# Patient Record
Sex: MALE | Race: WHITE | Employment: FULL TIME | URBAN - NONMETROPOLITAN AREA
[De-identification: names, ages, dates, MRNs, and addresses within clinical notes are randomized per-mention and may not be internally consistent; named-entity substitution may affect disease eponyms.]

---

## 2021-09-03 ENCOUNTER — NURSE ONLY (OUTPATIENT)
Dept: LAB | Age: 51
End: 2021-09-03

## 2023-09-25 ENCOUNTER — OFFICE VISIT (OUTPATIENT)
Dept: FAMILY MEDICINE CLINIC | Age: 53
End: 2023-09-25

## 2023-09-25 VITALS
SYSTOLIC BLOOD PRESSURE: 124 MMHG | RESPIRATION RATE: 16 BRPM | HEART RATE: 60 BPM | OXYGEN SATURATION: 96 % | DIASTOLIC BLOOD PRESSURE: 82 MMHG | BODY MASS INDEX: 28.2 KG/M2 | TEMPERATURE: 97.6 F | WEIGHT: 208.2 LBS | HEIGHT: 72 IN

## 2023-09-25 DIAGNOSIS — J02.9 SORE THROAT: ICD-10-CM

## 2023-09-25 DIAGNOSIS — R09.81 SINUS CONGESTION: Primary | ICD-10-CM

## 2023-09-25 LAB
Lab: NORMAL
QC PASS/FAIL: NORMAL
SARS-COV-2 RDRP RESP QL NAA+PROBE: NEGATIVE
STREPTOCOCCUS A RNA: NEGATIVE

## 2023-09-25 RX ORDER — LOSARTAN POTASSIUM 50 MG/1
50 TABLET ORAL DAILY
COMMUNITY
Start: 2023-08-21

## 2023-09-25 RX ORDER — ALLOPURINOL 100 MG/1
TABLET ORAL
COMMUNITY

## 2023-09-25 RX ORDER — DOXYCYCLINE HYCLATE 100 MG
100 TABLET ORAL 2 TIMES DAILY
Qty: 14 TABLET | Refills: 0 | Status: SHIPPED | OUTPATIENT
Start: 2023-09-25 | End: 2023-10-02

## 2023-09-25 ASSESSMENT — ENCOUNTER SYMPTOMS
ABDOMINAL PAIN: 0
RHINORRHEA: 1
VOMITING: 0
DIARRHEA: 0
WHEEZING: 0
SORE THROAT: 1
COUGH: 0
NAUSEA: 0
SHORTNESS OF BREATH: 0

## 2023-09-25 ASSESSMENT — PATIENT HEALTH QUESTIONNAIRE - PHQ9
1. LITTLE INTEREST OR PLEASURE IN DOING THINGS: 0
SUM OF ALL RESPONSES TO PHQ QUESTIONS 1-9: 0
2. FEELING DOWN, DEPRESSED OR HOPELESS: 0
SUM OF ALL RESPONSES TO PHQ9 QUESTIONS 1 & 2: 0
SUM OF ALL RESPONSES TO PHQ QUESTIONS 1-9: 0

## 2023-09-25 NOTE — PROGRESS NOTES
[address] [facility]  [address] [place] [id number]  Dept: [phone]  Dept Fax: [phone]  Loc: [phone]    Kirill Carson is a 48 y.o. male who presents today for his medical conditions/complaints as noted below. Chief Complaint   Patient presents with    Other     Pt complaining of scratchiness within sinuses- leaves [place] tomorrow, sx started over the weekend        HPI:     Patient presents to the office today for concerns of sinus scratchiness. States that over the last few days, he has noticed some scratchiness in his sinuses and postnasal drip. Denies any associated fever, chills, ear pain, cough, chest congestion, nausea, vomiting, diarrhea, or abdominal pain. Has had some sore throat from postnasal drip. Eating and drinking well without issues. No one else is sick at home currently. Patient mainly wanted to be seen today because he leaves to go to [place] tomorrow through the beginning of next week. Past Medical History:   Diagnosis Date    History of kidney stones       Past Surgical History:   Procedure Laterality Date    HERNIA REPAIR         Family History   Problem Relation Age of Onset    Heart Disease Mother     Diabetes Mother     Cancer Mother         colon       Social History     Tobacco Use    Smoking status: Former     Types: Cigarettes     Quit date: 2003     Years since quittin.9    Smokeless tobacco: Never   Substance Use Topics    Alcohol use: Not on file      Current Outpatient Medications   Medication Sig Dispense Refill    doxycycline hyclate (VIBRA-TABS) 100 MG tablet Take 1 tablet by mouth 2 times daily for 7 days 14 tablet 0    allopurinol (ZYLOPRIM) 100 MG tablet Take by mouth      losartan (COZAAR) 50 MG tablet Take 1 tablet by mouth daily       No current facility-administered medications for this visit.      Allergies   Allergen Reactions    Erythromycin     Pcn

## 2023-10-12 ENCOUNTER — OFFICE VISIT (OUTPATIENT)
Dept: FAMILY MEDICINE CLINIC | Age: 53
End: 2023-10-12
Payer: COMMERCIAL

## 2023-10-12 VITALS
OXYGEN SATURATION: 97 % | HEIGHT: 72 IN | RESPIRATION RATE: 16 BRPM | DIASTOLIC BLOOD PRESSURE: 80 MMHG | BODY MASS INDEX: 27.77 KG/M2 | WEIGHT: 205 LBS | SYSTOLIC BLOOD PRESSURE: 138 MMHG | HEART RATE: 102 BPM

## 2023-10-12 DIAGNOSIS — Z11.4 ENCOUNTER FOR SCREENING FOR HIV: ICD-10-CM

## 2023-10-12 DIAGNOSIS — Z11.59 NEED FOR HEPATITIS C SCREENING TEST: ICD-10-CM

## 2023-10-12 DIAGNOSIS — L20.84 INTRINSIC ATOPIC DERMATITIS: Primary | ICD-10-CM

## 2023-10-12 DIAGNOSIS — I10 PRIMARY HYPERTENSION: ICD-10-CM

## 2023-10-12 DIAGNOSIS — Z13.1 SCREENING FOR DIABETES MELLITUS: ICD-10-CM

## 2023-10-12 DIAGNOSIS — F41.9 ANXIETY: ICD-10-CM

## 2023-10-12 DIAGNOSIS — R23.3 PETECHIAE: ICD-10-CM

## 2023-10-12 PROBLEM — M10.9 GOUT: Status: ACTIVE | Noted: 2023-10-12

## 2023-10-12 PROBLEM — L20.9 ATOPIC DERMATITIS: Status: ACTIVE | Noted: 2021-05-04

## 2023-10-12 LAB
ALBUMIN SERPL-MCNC: 4.6 G/DL
ALP BLD-CCNC: 66 U/L
ALT SERPL-CCNC: 27 U/L
ANION GAP SERPL CALCULATED.3IONS-SCNC: 11 MMOL/L
AST SERPL-CCNC: 24 U/L
AVERAGE GLUCOSE: 140
BASOPHILS ABSOLUTE: 0 /ΜL
BASOPHILS RELATIVE PERCENT: 0.5 %
BILIRUB SERPL-MCNC: 0.9 MG/DL (ref 0.1–1.4)
BUN BLDV-MCNC: 16 MG/DL
CALCIUM SERPL-MCNC: 9.3 MG/DL
CHLORIDE BLD-SCNC: 105 MMOL/L
CHOLESTEROL, TOTAL: 160 MG/DL
CHOLESTEROL/HDL RATIO: ABNORMAL
CO2: 27 MMOL/L
CREAT SERPL-MCNC: 0.98 MG/DL
EGFR: >60
EOSINOPHILS ABSOLUTE: 0 /ΜL
EOSINOPHILS RELATIVE PERCENT: 0.8 %
GLUCOSE BLD-MCNC: 117 MG/DL
HBA1C MFR BLD: 6.5 %
HCT VFR BLD CALC: 45.2 % (ref 41–53)
HDLC SERPL-MCNC: 31 MG/DL (ref 35–70)
HEMOGLOBIN: 15.9 G/DL (ref 13.5–17.5)
INR BLD: 1
LDL CHOLESTEROL CALCULATED: 67 MG/DL (ref 0–160)
LYMPHOCYTES ABSOLUTE: 2 /ΜL
LYMPHOCYTES RELATIVE PERCENT: 36.2 %
MCH RBC QN AUTO: 29.7 PG
MCHC RBC AUTO-ENTMCNC: 35 G/DL
MCV RBC AUTO: 84.8 FL
MONOCYTES ABSOLUTE: 0.4 /ΜL
MONOCYTES RELATIVE PERCENT: 6.9 %
NEUTROPHILS ABSOLUTE: 3.1 /ΜL
NEUTROPHILS RELATIVE PERCENT: 55.6 %
NONHDLC SERPL-MCNC: ABNORMAL MG/DL
PDW BLD-RTO: 13 %
PLATELET # BLD: 228 K/ΜL
PMV BLD AUTO: 9.3 FL
POTASSIUM SERPL-SCNC: 4.3 MMOL/L
PROTIME: 10.7 SECONDS
RBC # BLD: 5.33 10^6/ΜL
SODIUM BLD-SCNC: 139 MMOL/L
TOTAL PROTEIN: 7.4
TRIGL SERPL-MCNC: 310 MG/DL
TSH SERPL DL<=0.05 MIU/L-ACNC: 1.35 UIU/ML
VLDLC SERPL CALC-MCNC: 62 MG/DL
WBC # BLD: 5.7 10^3/ML

## 2023-10-12 PROCEDURE — 3074F SYST BP LT 130 MM HG: CPT | Performed by: FAMILY MEDICINE

## 2023-10-12 PROCEDURE — 99214 OFFICE O/P EST MOD 30 MIN: CPT | Performed by: FAMILY MEDICINE

## 2023-10-12 PROCEDURE — 3078F DIAST BP <80 MM HG: CPT | Performed by: FAMILY MEDICINE

## 2023-10-12 NOTE — PROGRESS NOTES
Attending attestation:  I personally performed and participated key or critical portions of the evaluation and management including personally performing the exam and medical decision making. I verify the accuracy of the documentation by the resident with the following addition or changes: Here to establish. Has a rash on ankles that is new in the last couple weeks. Feeling better from recent illness with COVID-19. Rash pre-dated this. A bit tired from work trip to [facility] and [facility]. Was prescribed doxycycline prior to trip for sinus symptoms. On losartan for hypertension and allopurinol for gout. Rash is asymptomatic. Started before COVID-19. Occasional ibuprofen. A couple in the last week. Not dizzy. Mild orthostasis with standing. Had colonoscopy in last 1-2 years. On exam has petechiae on bilateral ankles. Follicular based red spots are reportedly always present lifetime. Will plan to check labs and have patient follow-up based on results.                 Electronically signed by Marquita Asif MD on 10/12/2023 at 10:53 AM

## 2023-10-12 NOTE — PROGRESS NOTES
[address] MEDICINE   [facility] [address]  Dept: [phone]  Loc: [phone] (:  1970) is a 48 y.o. male,Established patient, here for evaluation of the following chief complaint(s):  Rash (Bilateral ankle rash - declines itching - x couple weeks )      ASSESSMENT/PLAN:  1. Petechiae   APTT, platelet function analysis, INR  CBC w/auto diff, CMP, Hep C Ab, HIV screen, lipid panel  -     Von Willebrand Panel; Future  2. Intrinsic atopic dermatitis  3. Primary hypertension   Continue losartan 50mg PO 1x day  4. Anxiety  5. Encounter for screening for HIV   HIV screen  6. Need for hepatitis C screening test   Hep C Ab  7. Screening for diabetes mellitus   HbA1C, CMP      Return in about 2 weeks (around 10/26/2023) for Annual wellness visit, re-check petechiae. SUBJECTIVE/OBJECTIVE:    Patient reports he is doing ok, tired from work trip / conference to [place]. Got covid on the way back ~1 week ago. Not currently symptomatic. Was here in office day before trip with likely viral URI, was prescribed doxycycline if needed due to international travel. Patient is here for rash (petechia) around ankles, started at least a few weeks ago. No previous rash with same appearance. States has not gotten worse or better since then. No travel preceeding. Walks outdoors in [place] walking path in [place]. Petechia around ankles. Long time follicular red coloration present on lower extremities. Denies coagulopathy personal medical or family history  Patient reports feeling mild orthopnea, denies feeling dizzy. Patient denies current chest pain, SOB, n/v/d, other urinary or bowel complaints, feeling dizzy or lightheaded, or other symptoms at this time. Patient reports that he has had colonoscopy about 2 years ago. Patient declines vaccines at this time.     Review of Systems   Constitutional:  Negative for

## 2023-10-13 ASSESSMENT — ENCOUNTER SYMPTOMS
SHORTNESS OF BREATH: 0
DIARRHEA: 0
SORE THROAT: 0
NAUSEA: 0
CONSTIPATION: 0
ABDOMINAL PAIN: 0
VOMITING: 0
RHINORRHEA: 0

## 2023-10-16 ENCOUNTER — TELEPHONE (OUTPATIENT)
Dept: FAMILY MEDICINE CLINIC | Age: 53
End: 2023-10-16

## 2023-10-16 DIAGNOSIS — R89.9 ABNORMAL LABORATORY TEST: ICD-10-CM

## 2023-10-16 DIAGNOSIS — R23.3 PETECHIAE: Primary | ICD-10-CM

## 2023-10-16 NOTE — TELEPHONE ENCOUNTER
Amadou Godinez MD  [facility] Clinical Staff  Cc: Dante Bradley, DO  Labs back. New diabetes but no cause of petechiae on bilateral lower extremities identified. Possible Schamberg's disease but would appreciate Dr. Cadet Del input also on any additional work-up. Platelet count normal.  One test still pending but negative work-up otherwise. Especially if lesions are non-resolving might consider biopsy or additional evaluation for vasculitis. Confirm all cancer screenings are up to date. Monitor for any additional symptoms.

## 2023-10-16 NOTE — TELEPHONE ENCOUNTER
Patient called back. He stated he does have questions.  Informed to keep phone handy and  to call back

## 2023-10-16 NOTE — TELEPHONE ENCOUNTER
Message  Received: 3 days ago  Hoblit, Lindell Ahumada, MD  [facility] Clinical Staff  Please let patient know that blood sugars were elevated. Until appointment he can aim for a low carb, higher protein diet. Dr. Hermelindo Mix will discuss diagnosis further at upcoming appointment.

## 2023-10-16 NOTE — TELEPHONE ENCOUNTER
Chatted with patient. He reports petechiae are improved. Discussed I am uncertain of the significance of test results. Possible false positive. I have placed a referral to hematology/oncology to discuss. On further review it is also possible petechiae were an early COVID-19 symptom before he realized he was sick otherwise. Appreciate opinoin of specialist. Reviewed indications for prompt presentation. Reviewed nothing on labs makes me suspect oncologic diagnosis. Appreciated call.

## 2023-10-16 NOTE — TELEPHONE ENCOUNTER
----- Message from Kian De La Cruz MD sent at 10/16/2023 11:41 AM EDT -----  Abnormal activity in Von Willebrand Panel. Would appreciate hematology oncology input. I have placed a referral.  I am happy to chat with the patient about results. Please call and see if he has questions and put me on the phone with him if he does. Thanks.

## 2023-10-25 NOTE — PROGRESS NOTES
[facility] [address] [place] [address] Dionisio Flood [address]  [address] [facility] [address] [address]  Dept: [phone]  Dept Fax: [phone]: [phone]       Visit Date:10/27/2023     Elo Maher is a 48 y.o. male who presents today for:   Chief Complaint   Patient presents with    New Patient     Petechiae,Abnormal Lab Test        HPI:   Elo Maher is a 48 y.o. male referred to our office by Dr. Easton Trevizo for evaluation of petechiae, abnormal laboratory test.  PMH includes urolithiasis, anxiety, gout, HTN. Petechial rash noted on bilateral ankles, saw PCP. Recent COVID-19 at end of September/beginning of October. Platelets normal.  VWD panel abnormal.  He denies history of blood clots, CVAs, or MIs. Family history includes CVA, DM, cancer in mother. He does not currently smoke but has remote history of smoking until age 24. He denies alcohol or street drug use. VSS. He reports fatigue and dizziness at times after having COVID. He denies headaches, cough, SOB, CP, heart palpitations, LE redness/edema/warmth/pain, s/s bleeding. Petechial rash much improved per pt report, almost completely resolved. PMH, SH, and FH:  I reviewed the patient's medication and allergy lists as noted on the electronic medical record. The PMH, SH, and FH were also reviewed as noted on the EMR.         Past Medical History:   Diagnosis Date    Anxiety     Atopic dermatitis 2021    Gout     History of kidney stones     Hypertension     Seborrheic dermatitis 2021      Past Surgical History:   Procedure Laterality Date    HERNIA REPAIR        Family History   Problem Relation Age of Onset    Heart Disease Mother     Diabetes Mother     Cancer Mother         colon      Social History     Tobacco Use    Smoking status: Former     Packs/day: 1.00     Years: 5.00     Additional pack years: 0.00     Total pack years: 5.00     Types: Cigarettes     Quit date: 2003     Years since quittin.9

## 2023-10-27 ENCOUNTER — HOSPITAL ENCOUNTER (OUTPATIENT)
Dept: INFUSION THERAPY | Age: 53
Discharge: HOME | End: 2023-10-27
Payer: COMMERCIAL

## 2023-10-27 ENCOUNTER — OFFICE VISIT (OUTPATIENT)
Dept: ONCOLOGY | Age: 53
End: 2023-10-27
Payer: COMMERCIAL

## 2023-10-27 VITALS
RESPIRATION RATE: 16 BRPM | TEMPERATURE: 98.1 F | DIASTOLIC BLOOD PRESSURE: 80 MMHG | SYSTOLIC BLOOD PRESSURE: 130 MMHG | HEART RATE: 72 BPM

## 2023-10-27 VITALS
WEIGHT: 204 LBS | OXYGEN SATURATION: 98 % | DIASTOLIC BLOOD PRESSURE: 80 MMHG | RESPIRATION RATE: 16 BRPM | TEMPERATURE: 98.1 F | BODY MASS INDEX: 27.63 KG/M2 | SYSTOLIC BLOOD PRESSURE: 130 MMHG | HEART RATE: 72 BPM | HEIGHT: 72 IN

## 2023-10-27 DIAGNOSIS — R23.3 PETECHIAE: Primary | ICD-10-CM

## 2023-10-27 DIAGNOSIS — R23.3 PETECHIAE: ICD-10-CM

## 2023-10-27 PROCEDURE — 99211 OFF/OP EST MAY X REQ PHY/QHP: CPT

## 2023-10-27 PROCEDURE — 85240 CLOT FACTOR VIII AHG 1 STAGE: CPT

## 2023-10-27 PROCEDURE — 3079F DIAST BP 80-89 MM HG: CPT | Performed by: NURSE PRACTITIONER

## 2023-10-27 PROCEDURE — 85247 CLOT FACTOR VIII MULTIMETRIC: CPT

## 2023-10-27 PROCEDURE — 36415 COLL VENOUS BLD VENIPUNCTURE: CPT

## 2023-10-27 PROCEDURE — 85245 CLOT FACTOR VIII VW RISTOCTN: CPT

## 2023-10-27 PROCEDURE — 3075F SYST BP GE 130 - 139MM HG: CPT | Performed by: NURSE PRACTITIONER

## 2023-10-27 PROCEDURE — 85246 CLOT FACTOR VIII VW ANTIGEN: CPT

## 2023-10-27 PROCEDURE — 99203 OFFICE O/P NEW LOW 30 MIN: CPT | Performed by: NURSE PRACTITIONER

## 2023-11-01 NOTE — PROGRESS NOTES
[address] [address]  Dept: [phone]  Dept Fax: [phone]  Loc: [phone]    Germaine Christine is a 48 y.o. male who presents today for his medical conditions/complaints as noted below. Chief Complaint   Patient presents with    New Patient     Previous pt of Les Graves- pt denies any concerns or issues        HPI:     Patient is here in the office today to establish care. 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 Ruben Farr -- will get records  Shingles vaccine: due  Influenza vaccine: thinks he may have had this fall  Pneumonia vaccine: will think about this    Patient was seen in our office by another provider on 10/12/2023 for petechial rash on ankles. Labs at that time showed normal CBC, hepatitis C screening, HIV screening, PT/INR, PTT, CMP, TSH. Cholesterol panel showed low HDL, elevated triglycerides. Hemoglobin A1c 6.5% (new onset type 2 diabetes). Von Willebrand factor activity slightly elevated. Patient was referred to hematology for evaluation. Patient saw hematology Otemma Wilkerson) on 10/27/2023 --it was noted that von Willebrand activity can be abnormal after COVID-19 infection, which patient did have recently. Von Willebrand multimeric panel was ordered for further evaluation.   It was noted that patient would not need any additional lab work-up if this panel is normal. Rash is significantly

## 2023-11-02 ENCOUNTER — OFFICE VISIT (OUTPATIENT)
Dept: FAMILY MEDICINE CLINIC | Age: 53
End: 2023-11-02
Payer: COMMERCIAL

## 2023-11-02 VITALS
RESPIRATION RATE: 16 BRPM | HEIGHT: 72 IN | OXYGEN SATURATION: 97 % | BODY MASS INDEX: 28.17 KG/M2 | SYSTOLIC BLOOD PRESSURE: 120 MMHG | DIASTOLIC BLOOD PRESSURE: 80 MMHG | WEIGHT: 208 LBS | HEART RATE: 90 BPM

## 2023-11-02 DIAGNOSIS — E78.5 DYSLIPIDEMIA: ICD-10-CM

## 2023-11-02 DIAGNOSIS — E11.9 TYPE 2 DIABETES MELLITUS WITHOUT COMPLICATION, WITHOUT LONG-TERM CURRENT USE OF INSULIN (HCC): Primary | ICD-10-CM

## 2023-11-02 DIAGNOSIS — R23.3 PETECHIAE: ICD-10-CM

## 2023-11-02 DIAGNOSIS — Z87.39 HISTORY OF GOUT: ICD-10-CM

## 2023-11-02 DIAGNOSIS — I10 PRIMARY HYPERTENSION: ICD-10-CM

## 2023-11-02 PROCEDURE — 3079F DIAST BP 80-89 MM HG: CPT | Performed by: STUDENT IN AN ORGANIZED HEALTH CARE EDUCATION/TRAINING PROGRAM

## 2023-11-02 PROCEDURE — 99214 OFFICE O/P EST MOD 30 MIN: CPT | Performed by: STUDENT IN AN ORGANIZED HEALTH CARE EDUCATION/TRAINING PROGRAM

## 2023-11-02 PROCEDURE — 3044F HG A1C LEVEL LT 7.0%: CPT | Performed by: STUDENT IN AN ORGANIZED HEALTH CARE EDUCATION/TRAINING PROGRAM

## 2023-11-02 PROCEDURE — 3074F SYST BP LT 130 MM HG: CPT | Performed by: STUDENT IN AN ORGANIZED HEALTH CARE EDUCATION/TRAINING PROGRAM

## 2023-11-02 RX ORDER — KETOCONAZOLE 20 MG/G
CREAM TOPICAL
COMMUNITY
Start: 2023-10-26

## 2023-11-02 RX ORDER — CICLOPIROX 1 G/100ML
SHAMPOO TOPICAL
COMMUNITY
Start: 2023-10-31

## 2023-11-02 ASSESSMENT — ENCOUNTER SYMPTOMS
VOMITING: 0
NAUSEA: 0
SHORTNESS OF BREATH: 0
ABDOMINAL PAIN: 0
COUGH: 0

## 2023-11-02 NOTE — PROGRESS NOTES
Que Valles is a 48 y.o. male who presents today for:  Chief Complaint   Patient presents with    New Patient     Previous pt of Odilia Kulkarni- pt denies any concerns or issues      HPI:   Que Valles is 48 y.o. who presents today to establish care in office and for annual physical.      Previously followed with Dr. Odilia Kulkarni, who he reports moved and so he would like to establish care in this office. Reports hx of kidney stones -- none in the recent past.  Has passed through proper hydration in the past.  No surgical intervention needed in the past.    At last visit in office with Dr. Horton Said, pt was noted to have a petechial rash in the ankles. Labs were normal overall. Showed elevated vWF activity. Also noted to have recent COVID-19 infection earlier this month. Further follow up with Hematology Edita Stack) noted elevated vWF to be related to COVID-19 infection. Repeat vWF labs ordered and patient is scheduled to follow up further with hematology. Today, he reports that the rash in the ankles has reduced since last noted in previous visit note. Other labs done recently (10/12/23) showed elevated HbA1c of 6.5%. Pt reports no prior history of DM or elevated HbA1c noted previously. PMHx: HTN, Gout, Nephrolithiasis, New-onset DM    Medications:  Losartan (for HTN), Allopurinol (for Gout). Reports compliance with medications. Family Hx:  DM and Colon CA in the mother. Father: Nil    Surgical Hx:  None    Social Hx:  Non-smoker, (hx of smoking in the 20s, not chronic). Rare EtOH use. Works at [employer] as a [employer] and . Lives with Wife and son at home, has a daughter who currently is in college. Diet: vegan. Exercise: used to bike more often previously. Lately has been walking more. Preventive Care:   -- Colon CA screening:  Reports having Colonoscopy done last year -- results negative.   Recommended follow up in 5 years, due to (+) family hx

## 2023-11-03 LAB — VON WILLEBRAND MULTIMERS: NORMAL

## 2023-11-07 DIAGNOSIS — R23.3 PETECHIAE: Primary | ICD-10-CM

## 2024-02-16 ENCOUNTER — NURSE ONLY (OUTPATIENT)
Dept: FAMILY MEDICINE CLINIC | Age: 54
End: 2024-02-16
Payer: COMMERCIAL

## 2024-02-16 DIAGNOSIS — E11.9 TYPE 2 DIABETES MELLITUS WITHOUT COMPLICATION, UNSPECIFIED WHETHER LONG TERM INSULIN USE (HCC): Primary | ICD-10-CM

## 2024-02-16 DIAGNOSIS — E11.9 TYPE 2 DIABETES MELLITUS WITHOUT COMPLICATION, WITHOUT LONG-TERM CURRENT USE OF INSULIN (HCC): ICD-10-CM

## 2024-02-16 DIAGNOSIS — E78.5 DYSLIPIDEMIA: ICD-10-CM

## 2024-02-16 LAB
CHOLEST SERPL-MCNC: 165 MG/DL (ref 100–199)
CREAT UR-MCNC: 210 MG/DL
DEPRECATED MEAN GLUCOSE BLD GHB EST-ACNC: 105 MG/DL (ref 70–126)
HBA1C MFR BLD HPLC: 5.5 % (ref 4.4–6.4)
HDLC SERPL-MCNC: 36 MG/DL
LDLC SERPL CALC-MCNC: 99 MG/DL
MICROALBUMIN UR-MCNC: < 1.2 MG/DL
MICROALBUMIN/CREAT RATIO PNL UR: 6 MG/G (ref 0–30)
TRIGL SERPL-MCNC: 152 MG/DL (ref 0–199)

## 2024-02-16 PROCEDURE — 36415 COLL VENOUS BLD VENIPUNCTURE: CPT | Performed by: STUDENT IN AN ORGANIZED HEALTH CARE EDUCATION/TRAINING PROGRAM

## 2024-02-19 ENCOUNTER — TELEPHONE (OUTPATIENT)
Dept: FAMILY MEDICINE CLINIC | Age: 54
End: 2024-02-19

## 2024-02-19 NOTE — PROGRESS NOTES
[facility] PROFESSIONAL SERVS  [facility]  [address] [id number]  Dept: [phone]  Dept Fax: [phone]  Loc: [phone]    Francis Saavedra is a 54 y.o. male who presents today for his medical conditions/complaints as noted below.     Chief Complaint   Patient presents with    Diabetes     Pt denies any concerns or issues        HPI:     Patient presents the office today for a follow-up on labs.    Labs showed significantly improved hemoglobin A1c at 5.5% (down from 6.5%), normal urine microalbumin, and stable cholesterol panel.  Patient is not currently taking any cholesterol or diabetes medication -- would prefer to hold off on statin at this time.    The 10-year ASCVD risk score (Sim HICKS, et al., 2019) is: 8.9%    Values used to calculate the score:      Age: 54 years      Sex: Male      Is Non- : No      Diabetic: Yes      Tobacco smoker: No      Systolic Blood Pressure: 120 mmHg      Is BP treated: Yes      HDL Cholesterol: 36 mg/dL      Total Cholesterol: 165 mg/dL    Hypertension:  Take losartan 50 mg daily without issues.  /80 in the office today.    Patient [facility] have [facility] Taveras.        Past Medical History:   Diagnosis Date    Anxiety     Atopic dermatitis 2021    Gout     History of kidney stones     Hypertension     Seborrheic dermatitis 2021      Past Surgical History:   Procedure Laterality Date    HERNIA REPAIR         Family History   Problem Relation Age of Onset    Heart Disease Mother     Diabetes Mother     Cancer Mother         colon       Social History     Tobacco Use    Smoking status: Former     Current packs/day: 0.00     Average packs/day: 1 pack/day for 5.0 years (5.0 ttl pk-yrs)     Types: Cigarettes     Start date: 1998     Quit date: 2003     Years since quittin.3     Passive exposure: Never    Smokeless tobacco:

## 2024-02-19 NOTE — TELEPHONE ENCOUNTER
----- Message from Mercedes Duggan DO sent at 2/16/2024  2:47 PM EST -----  Please let patient know that his labs showed normal urine test for diabetes, significantly improved hemoglobin A1c (it went from 6.5% to 5.5%), and cholesterol is also improved. Thanks    Mercedes Duggan DO

## 2024-02-20 ENCOUNTER — TELEPHONE (OUTPATIENT)
Dept: FAMILY MEDICINE CLINIC | Age: 54
End: 2024-02-20

## 2024-02-20 ENCOUNTER — OFFICE VISIT (OUTPATIENT)
Dept: FAMILY MEDICINE CLINIC | Age: 54
End: 2024-02-20
Payer: COMMERCIAL

## 2024-02-20 VITALS
RESPIRATION RATE: 16 BRPM | HEART RATE: 54 BPM | WEIGHT: 191.8 LBS | OXYGEN SATURATION: 99 % | BODY MASS INDEX: 25.98 KG/M2 | HEIGHT: 72 IN | SYSTOLIC BLOOD PRESSURE: 120 MMHG | DIASTOLIC BLOOD PRESSURE: 80 MMHG

## 2024-02-20 DIAGNOSIS — E78.5 DYSLIPIDEMIA: ICD-10-CM

## 2024-02-20 DIAGNOSIS — I10 PRIMARY HYPERTENSION: ICD-10-CM

## 2024-02-20 DIAGNOSIS — E11.9 TYPE 2 DIABETES MELLITUS WITHOUT COMPLICATION, WITHOUT LONG-TERM CURRENT USE OF INSULIN (HCC): Primary | ICD-10-CM

## 2024-02-20 DIAGNOSIS — Z87.39 HISTORY OF GOUT: ICD-10-CM

## 2024-02-20 PROCEDURE — 3044F HG A1C LEVEL LT 7.0%: CPT | Performed by: STUDENT IN AN ORGANIZED HEALTH CARE EDUCATION/TRAINING PROGRAM

## 2024-02-20 PROCEDURE — 99214 OFFICE O/P EST MOD 30 MIN: CPT | Performed by: STUDENT IN AN ORGANIZED HEALTH CARE EDUCATION/TRAINING PROGRAM

## 2024-02-20 PROCEDURE — 3079F DIAST BP 80-89 MM HG: CPT | Performed by: STUDENT IN AN ORGANIZED HEALTH CARE EDUCATION/TRAINING PROGRAM

## 2024-02-20 PROCEDURE — 3074F SYST BP LT 130 MM HG: CPT | Performed by: STUDENT IN AN ORGANIZED HEALTH CARE EDUCATION/TRAINING PROGRAM

## 2024-02-20 RX ORDER — LOSARTAN POTASSIUM 50 MG/1
50 TABLET ORAL DAILY
Qty: 90 TABLET | Refills: 3 | Status: SHIPPED | OUTPATIENT
Start: 2024-02-20

## 2024-02-20 RX ORDER — ALLOPURINOL 100 MG/1
100 TABLET ORAL DAILY
Qty: 90 TABLET | Refills: 3 | Status: SHIPPED | OUTPATIENT
Start: 2024-02-20

## 2024-02-20 SDOH — ECONOMIC STABILITY: INCOME INSECURITY: HOW HARD IS IT FOR YOU TO PAY FOR THE VERY BASICS LIKE FOOD, HOUSING, MEDICAL CARE, AND HEATING?: NOT HARD AT ALL

## 2024-02-20 SDOH — ECONOMIC STABILITY: FOOD INSECURITY: WITHIN THE PAST 12 MONTHS, YOU WORRIED THAT YOUR FOOD WOULD RUN OUT BEFORE YOU GOT MONEY TO BUY MORE.: NEVER TRUE

## 2024-02-20 SDOH — ECONOMIC STABILITY: HOUSING INSECURITY
IN THE LAST 12 MONTHS, WAS THERE A TIME WHEN YOU DID NOT HAVE A STEADY PLACE TO SLEEP OR SLEPT IN A SHELTER (INCLUDING NOW)?: NO

## 2024-02-20 SDOH — ECONOMIC STABILITY: FOOD INSECURITY: WITHIN THE PAST 12 MONTHS, THE FOOD YOU BOUGHT JUST DIDN'T LAST AND YOU DIDN'T HAVE MONEY TO GET MORE.: NEVER TRUE

## 2024-02-20 ASSESSMENT — ENCOUNTER SYMPTOMS
NAUSEA: 0
SHORTNESS OF BREATH: 0
VOMITING: 0
ABDOMINAL PAIN: 0
COUGH: 0

## 2024-02-20 ASSESSMENT — PATIENT HEALTH QUESTIONNAIRE - PHQ9
SUM OF ALL RESPONSES TO PHQ9 QUESTIONS 1 & 2: 0
1. LITTLE INTEREST OR PLEASURE IN DOING THINGS: 0
2. FEELING DOWN, DEPRESSED OR HOPELESS: 0
SUM OF ALL RESPONSES TO PHQ QUESTIONS 1-9: 0

## 2024-02-20 NOTE — TELEPHONE ENCOUNTER
----- Message from Mercedes Duggan DO sent at 2/20/2024 10:41 AM EST -----  Patient reports having had colonoscopy completed with Dr. Taveras at [facility]. Please get records. Thanks    Mercedes Duggan DO

## 2024-05-30 ENCOUNTER — TELEPHONE (OUTPATIENT)
Dept: FAMILY MEDICINE CLINIC | Age: 54
End: 2024-05-30

## 2024-05-30 NOTE — TELEPHONE ENCOUNTER
Patients wife calling and states that patient suddenly/ unexpectedly passed away on 24. Chart marked      Wife's number- [phone]

## 2024-05-31 ENCOUNTER — TELEPHONE (OUTPATIENT)
Dept: FAMILY MEDICINE CLINIC | Age: 54
End: 2024-05-31

## 2024-05-31 NOTE — TELEPHONE ENCOUNTER
Chatted with Ruma.  History of hypertension and hyperlipidemia.  Cause of death was a vessel blockage from atherosclerotic and hypertensive coronary artery disease.  Had plaques and myocardial fibrosis and cardiomegaly (can happen with heart attack and enlarged heart).  Pulmonary congestion and incidental gallbladder and arterionephrosclerosis findings.  On history came back from a bike ride and was found dead less than an hour later in the bathroom. Never called for help. Discuss that this seems like a sudden heart attack caused death. No modifiable risk factors.  Blood pressure was at goal.  Cholesterol was not high enough to require medication.  Healthy eater.  Reviewed could keep AED in house if desired but likely would not have made a difference. Even if they had found him right away; survival rates for out of hospital cardiac arrest are under 10%.  Event was likely a very sudden clot or plaque breakage causing death in minutes from the heart ceasing to function as a pump.  Offered condolences.  For family member and children, should let their doctors know about the family history of heart attack at age 52 and get checked out if any chest pain, shortness of breath, or other symptoms of heart attack. Should get regular physicals for blood pressure and cholesterol monitoring. Hopefully medicine will advance with additional ways to modify risk but nothing concrete to do otherwise.  Can call my cell if additional questions or concerns.  Ruma will fill out record request.